# Patient Record
(demographics unavailable — no encounter records)

---

## 2024-12-06 NOTE — HISTORY OF PRESENT ILLNESS
[FreeTextEntry2] : Alex is a 15 year 1 month old boy referred by his pediatrician for an initial evaluation of elevated TSH and A1c.  Medical records reviewed consist of laboratory testing from 7/2024 showing mildly above range TSH of 5.18 uIU/ml, normal FT4 of 1.2 ng/dl, HbA1c borderline at 5.8%. Previous testing since 2021 has shown elevated TSH levels ~8 uIU/ml and one HbA1c of 5.7%. TPO Ab have been positive, Tg Ab have been negative.

## 2024-12-06 NOTE — CONSULT LETTER
[Dear  ___] : Dear  [unfilled], [Consult Letter:] : I had the pleasure of evaluating your patient, [unfilled]. [Please see my note below.] : Please see my note below. [Consult Closing:] : Thank you very much for allowing me to participate in the care of this patient.  If you have any questions, please do not hesitate to contact me. [Sincerely,] : Sincerely, [FreeTextEntry3] : Honey Jensen MD

## 2024-12-16 NOTE — HISTORY OF PRESENT ILLNESS
[Headaches] : no headaches [Visual Symptoms] : no ~T visual symptoms [Polyuria] : no polyuria [Polydipsia] : no polydipsia [Knee Pain] : no knee pain [Hip Pain] : no hip pain [Constipation] : no constipation [Fatigue] : no fatigue [Anorexia] : no anorexia [Abdominal Pain] : no abdominal pain [Nausea] : no nausea [Vomiting] : no vomiting [FreeTextEntry2] : Alex is a 15 year 1 month old boy referred by his pediatrician for an initial evaluation of elevated TSH and A1c.  Medical records reviewed consist of laboratory testing from 7/2024 showing mildly above range TSH of 5.18 uIU/ml, normal FT4 of 1.2 ng/dl, HbA1c borderline at 5.8%. Previous testing since 2021 has shown elevated TSH levels ~8 uIU/ml and one HbA1c of 5.7%. TPO Ab have been positive, Tg Ab have been negative.   Alex's mother reports that he was seen by an endocrinologist in the past who noted his TSH to be mildly elevated but whose impression was that he does not need thyroid hormone treatment. Dr. Beltre recently tested his thyorid function again and noted his TSH to be mildly elevated and again referred him to endocrinology. Alex's mother reports that his maternal grandmother had thyroid cancer and may also have had dysfunction of her thyroid. Alex's uncle had a thyroid disorder and was very thin, however, the type of disorder is unknown. By report a HbA1c was tested due to his paternal grandfather having diabetes.

## 2024-12-16 NOTE — PHYSICAL EXAM
[Enlarged Diffusely] : was diffusely enlarged [None] : there were no thyroid nodules [Murmur] : no murmurs [de-identified] : wearing glasses [de-identified] : mildly enlarged

## 2025-03-06 NOTE — HISTORY OF PRESENT ILLNESS
[de-identified] : In office for follow-up for food allergies. History of swollen lips and swollen tongue from tilapia at the age 9.  Subsequent testing showed IgE to peanut (no previous exposure), tree nuts and all seafood.  Blood work in 2023 showed high IgE to peanut, moderate IgE to seafood and lower IgE to tree nuts. Today he reports avoiding all peanuts, tree nuts and seafood including fish.  No accidental exposure.  Does not carry EpiPen with him. No seasonal allergies, asthma or frequent infections.

## 2025-03-06 NOTE — SOCIAL HISTORY
[de-identified] : House with carpet in the bedroom, 1 dog, room air conditioning, no cigarette smoke exposure.

## 2025-03-06 NOTE — ASSESSMENT
[FreeTextEntry1] : Food allergies: Seafood including fish, peanuts and tree nuts.  Continue avoidance.  Retest with blood work prior to next visit next year.  Reinforced to carry EpiPen at all times, refilled, technique reviewed.  Food allergy plan given and reviewed.  Follow-up yearly, blood work before the visit.

## 2025-03-06 NOTE — PHYSICAL EXAM
[Alert] : alert [No Acute Distress] : no acute distress [Normal Voice/Communication] : normal voice communication [Supple] : the neck was supple [Normal S1, S2] : normal S1 and S2 [Regular Rhythm] : with a regular rhythm [Normal Cervical Lymph Nodes] : cervical [Skin Intact] : skin intact  [No clubbing] : no clubbing [No Rash] : no rash [No Cyanosis] : no cyanosis [Alert, Awake, Oriented as Age-Appropriate] : alert, awake, oriented as age appropriate [de-identified] : Eyes clear. [de-identified] : Chest clear, good air entry, no wheezing or crackles. [de-identified] : Throat clear.  Nasal mucosa pink, no stuffiness or discharge.  Tympanic membranes obscured by some wax in bilateral ear canals.  No sinus tenderness.

## 2025-05-30 NOTE — HISTORY OF PRESENT ILLNESS
[Headaches] : no headaches [Visual Symptoms] : no ~T visual symptoms [Polyuria] : no polyuria [Polydipsia] : no polydipsia [Knee Pain] : no knee pain [Hip Pain] : no hip pain [Constipation] : no constipation [Fatigue] : no fatigue [Anorexia] : no anorexia [Abdominal Pain] : no abdominal pain [Nausea] : no nausea [Vomiting] : no vomiting [FreeTextEntry2] : Alex is a 15 year 7 month old boy with elevated TSH and A1c here for follow up. He was seen by me initially in 12/2024. Laboratory testing from 7/2024 showed mildly above range TSH of 5.18 uIU/ml, normal FT4 of 1.2 ng/dl, HbA1c borderline at 5.8%. Previous testing since 2021 has shown elevated TSH levels ~8 uIU/ml and one HbA1c of 5.7%. TPO Ab have been positive, Tg Ab have been negative. Alex likely has subclinical hypothyroidism due to Hashimoto's thyroiditis. Repeat testing was advised but not yet done.  Alex returns for follow up of his subclinical hypothyroidism. His mother reports that .   15 year 7 month old boy with likely subclinical hypothyroidism due to Hashimoto's thyroiditis as he has a mildly elevated TSH, positive anti-TPO Ab, family history of thyroid disorders, and mildly enlarged thyroid on examination. He is clinically  . Today, repeat thyroid function tests will be done to determine if he needs to start levothyroxine treatment vs. continued monitoring. Alex has also recently been noted to have a borderline HbA1c, however, he is thin with normal BMI and does not have acanthosis nigricans. Therefore, his risk for type 2 diabetes is low. A HbA1c will be repeated as well. He will follow up with me in 6 months.

## 2025-05-30 NOTE — CONSULT LETTER
[Dear  ___] : Dear  [unfilled], [Courtesy Letter:] : I had the pleasure of seeing your patient, [unfilled], in my office today. [Please see my note below.] : Please see my note below. [Sincerely,] : Sincerely, [FreeTextEntry3] : Honey Jensen MD

## 2025-05-30 NOTE — PHYSICAL EXAM
[Healthy Appearing] : healthy appearing [Well Nourished] : well nourished [Interactive] : interactive [Normal Appearance] : normal appearance [Well formed] : well formed [Normally Set] : normally set [Enlarged Diffusely] : was diffusely enlarged [None] : there were no thyroid nodules [Normal S1 and S2] : normal S1 and S2 [Murmur] : no murmurs [Clear to Ausculation Bilaterally] : clear to auscultation bilaterally [Abdomen Soft] : soft [Abdomen Tenderness] : non-tender [] : no hepatosplenomegaly [Normal] : normal  [de-identified] : wearing glasses [de-identified] : mildly enlarged

## 2025-06-09 NOTE — ASSESSMENT
[FreeTextEntry1] : 15 year 7 month old boy with likely subclinical hypothyroidism due to Hashimoto's thyroiditis as he has a mildly elevated TSH, has intermittently positive anti-TPO Ab, family history of thyroid disorders, and mildly enlarged thyroid on examination. He is clinically euthyroid. Recent thyroid function tests showed his TSH to be mildly above range and therefore he does not need to start levothyroxine treatment at this time. Alex has also recently been noted to have a borderline HbA1c, however, he is thin with normal BMI and does not have acanthosis nigricans. Repeat HbA1c is stable and does not need to followed but signs/symptoms of diabetes were reviewed. He will follow up with me in 6 months.

## 2025-06-09 NOTE — HISTORY OF PRESENT ILLNESS
[Headaches] : no headaches [Visual Symptoms] : no ~T visual symptoms [Polyuria] : no polyuria [Polydipsia] : no polydipsia [Knee Pain] : no knee pain [Hip Pain] : no hip pain [Constipation] : no constipation [Fatigue] : no fatigue [Anorexia] : no anorexia [Abdominal Pain] : no abdominal pain [Nausea] : no nausea [Vomiting] : no vomiting [FreeTextEntry2] : Alex is a 15 year 7 month old boy with elevated TSH and A1c here for follow up. He was seen by me initially in 12/2024. Laboratory testing from 7/2024 showed mildly above range TSH of 5.18 uIU/ml, normal FT4 of 1.2 ng/dl, HbA1c borderline at 5.8%. Previous testing since 2021 has shown elevated TSH levels ~8 uIU/ml and one HbA1c of 5.7%. TPO Ab have been positive, Tg Ab have been negative. Alex likely has subclinical hypothyroidism due to Hashimoto's thyroiditis.   Alex returns for follow up of his subclinical hypothyroidism. His parents report that he has been healthy in the interim. Recent testing shows stable A1c at 5.7%, TSH mildly above range but now Ab negative.

## 2025-07-27 NOTE — PHYSICAL EXAM
[Alert] : alert [No Acute Distress] : no acute distress [Normocephalic] : normocephalic [EOMI Bilateral] : EOMI bilateral [PERRLA] : WILIAN [Clear tympanic membranes with bony landmarks and light reflex present bilaterally] : clear tympanic membranes with bony landmarks and light reflex present bilaterally  [Pink Nasal Mucosa] : pink nasal mucosa [Nonerythematous Oropharynx] : nonerythematous oropharynx [Supple, full passive range of motion] : supple, full passive range of motion [No Palpable Masses] : no palpable masses [Clear to Auscultation Bilaterally] : clear to auscultation bilaterally [Regular Rate and Rhythm] : regular rate and rhythm [Normal S1, S2 audible] : normal S1, S2 audible [No Murmurs] : no murmurs [+2 Femoral Pulses] : +2 femoral pulses [Soft] : soft [NonTender] : non tender [Non Distended] : non distended [No Hepatomegaly] : no hepatomegaly [No Splenomegaly] : no splenomegaly [Abdiel: _____] : Abdiel [unfilled] [Uncircumcised] : uncircumcised [Bilateral descended testes] : bilateral descended testes [No Abnormal Lymph Nodes Palpated] : no abnormal lymph nodes palpated [Normal Muscle Tone] : normal muscle tone [No Gait Asymmetry] : no gait asymmetry [No pain or deformities with palpation of bone, muscles, joints] : no pain or deformities with palpation of bone, muscles, joints [Straight] : straight [No Scoliosis] : no scoliosis [+2 Patella DTR] : +2 patella DTR [Cranial Nerves Grossly Intact] : cranial nerves grossly intact [No Rash or Lesions] : no rash or lesions [de-identified] : Pustular nodular acne on cheeks and back, some scars

## 2025-07-27 NOTE — COUNSELING
[Use of Plain Language] : use of plain language [Adequate] : adequate [None] : none [FreeTextEntry1] : H/ English/Divehi

## 2025-07-27 NOTE — COUNSELING
[Use of Plain Language] : use of plain language [Adequate] : adequate [None] : none [FreeTextEntry1] : H/ English/Chinese

## 2025-07-27 NOTE — PHYSICAL EXAM
[Alert] : alert [No Acute Distress] : no acute distress [Normocephalic] : normocephalic [EOMI Bilateral] : EOMI bilateral [PERRLA] : WILIAN [Clear tympanic membranes with bony landmarks and light reflex present bilaterally] : clear tympanic membranes with bony landmarks and light reflex present bilaterally  [Pink Nasal Mucosa] : pink nasal mucosa [Nonerythematous Oropharynx] : nonerythematous oropharynx [Supple, full passive range of motion] : supple, full passive range of motion [No Palpable Masses] : no palpable masses [Clear to Auscultation Bilaterally] : clear to auscultation bilaterally [Regular Rate and Rhythm] : regular rate and rhythm [Normal S1, S2 audible] : normal S1, S2 audible [No Murmurs] : no murmurs [+2 Femoral Pulses] : +2 femoral pulses [Soft] : soft [NonTender] : non tender [Non Distended] : non distended [No Hepatomegaly] : no hepatomegaly [No Splenomegaly] : no splenomegaly [Abdiel: _____] : Abdiel [unfilled] [Uncircumcised] : uncircumcised [Bilateral descended testes] : bilateral descended testes [No Abnormal Lymph Nodes Palpated] : no abnormal lymph nodes palpated [Normal Muscle Tone] : normal muscle tone [No Gait Asymmetry] : no gait asymmetry [No pain or deformities with palpation of bone, muscles, joints] : no pain or deformities with palpation of bone, muscles, joints [Straight] : straight [No Scoliosis] : no scoliosis [+2 Patella DTR] : +2 patella DTR [Cranial Nerves Grossly Intact] : cranial nerves grossly intact [No Rash or Lesions] : no rash or lesions [de-identified] : Pustular nodular acne on cheeks and back, some scars

## 2025-07-27 NOTE — PHYSICAL EXAM
[Alert] : alert [No Acute Distress] : no acute distress [Normocephalic] : normocephalic [EOMI Bilateral] : EOMI bilateral [PERRLA] : WILIAN [Clear tympanic membranes with bony landmarks and light reflex present bilaterally] : clear tympanic membranes with bony landmarks and light reflex present bilaterally  [Pink Nasal Mucosa] : pink nasal mucosa [Nonerythematous Oropharynx] : nonerythematous oropharynx [Supple, full passive range of motion] : supple, full passive range of motion [No Palpable Masses] : no palpable masses [Clear to Auscultation Bilaterally] : clear to auscultation bilaterally [Regular Rate and Rhythm] : regular rate and rhythm [Normal S1, S2 audible] : normal S1, S2 audible [No Murmurs] : no murmurs [+2 Femoral Pulses] : +2 femoral pulses [Soft] : soft [NonTender] : non tender [Non Distended] : non distended [No Hepatomegaly] : no hepatomegaly [No Splenomegaly] : no splenomegaly [Abdiel: _____] : Abdiel [unfilled] [Uncircumcised] : uncircumcised [Bilateral descended testes] : bilateral descended testes [No Abnormal Lymph Nodes Palpated] : no abnormal lymph nodes palpated [Normal Muscle Tone] : normal muscle tone [No Gait Asymmetry] : no gait asymmetry [No pain or deformities with palpation of bone, muscles, joints] : no pain or deformities with palpation of bone, muscles, joints [Straight] : straight [No Scoliosis] : no scoliosis [+2 Patella DTR] : +2 patella DTR [Cranial Nerves Grossly Intact] : cranial nerves grossly intact [No Rash or Lesions] : no rash or lesions [de-identified] : Pustular nodular acne on cheeks and back, some scars

## 2025-07-27 NOTE — COUNSELING
[Use of Plain Language] : use of plain language [Adequate] : adequate [None] : none [FreeTextEntry1] : H/ English/Hungarian